# Patient Record
Sex: MALE
[De-identification: names, ages, dates, MRNs, and addresses within clinical notes are randomized per-mention and may not be internally consistent; named-entity substitution may affect disease eponyms.]

---

## 2017-03-21 ENCOUNTER — HOSPITAL ENCOUNTER (INPATIENT)
Dept: HOSPITAL 14 - H.ER | Age: 47
LOS: 2 days | Discharge: HOME | DRG: 313 | End: 2017-03-23
Attending: FAMILY MEDICINE | Admitting: FAMILY MEDICINE
Payer: COMMERCIAL

## 2017-03-21 DIAGNOSIS — Z23: ICD-10-CM

## 2017-03-21 DIAGNOSIS — R07.9: Primary | ICD-10-CM

## 2017-03-21 DIAGNOSIS — E16.2: ICD-10-CM

## 2017-03-21 DIAGNOSIS — J45.909: ICD-10-CM

## 2017-03-21 LAB
ALBUMIN/GLOB SERPL: 1.3 {RATIO} (ref 1–2.1)
ALP SERPL-CCNC: 62 U/L (ref 38–126)
ALT SERPL-CCNC: 25 U/L (ref 21–72)
APTT BLD: 22 SECONDS (ref 23.3–32.5)
AST SERPL-CCNC: 42 U/L (ref 17–59)
BASOPHILS # BLD AUTO: 0 K/UL (ref 0–0.2)
BASOPHILS NFR BLD: 0.6 % (ref 0–2)
BILIRUB SERPL-MCNC: 1.1 MG/DL (ref 0.2–1.3)
BUN SERPL-MCNC: 28 MG/DL (ref 9–20)
CALCIUM SERPL-MCNC: 9.2 MG/DL (ref 8.4–10.2)
CHLORIDE SERPL-SCNC: 101 MMOL/L (ref 98–107)
CHOLEST SERPL-MCNC: 150 MG/DL (ref 0–199)
CO2 SERPL-SCNC: 27 MMOL/L (ref 22–30)
EOSINOPHIL # BLD AUTO: 0.1 K/UL (ref 0–0.7)
EOSINOPHIL NFR BLD: 1.5 % (ref 0–4)
ERYTHROCYTE [DISTWIDTH] IN BLOOD BY AUTOMATED COUNT: 13.5 % (ref 11.5–14.5)
GLOBULIN SER-MCNC: 3.2 GM/DL (ref 2.2–3.9)
GLUCOSE SERPL-MCNC: 98 MG/DL (ref 75–110)
HCT VFR BLD CALC: 42.6 % (ref 35–51)
LIPASE SERPL-CCNC: 37 U/L (ref 23–300)
LYMPHOCYTES # BLD AUTO: 0.9 K/UL (ref 1–4.3)
LYMPHOCYTES NFR BLD AUTO: 22.4 % (ref 20–40)
MCH RBC QN AUTO: 31.8 PG (ref 27–31)
MCHC RBC AUTO-ENTMCNC: 34.3 G/DL (ref 33–37)
MCV RBC AUTO: 92.9 FL (ref 80–94)
MONOCYTES # BLD: 0.4 K/UL (ref 0–0.8)
MONOCYTES NFR BLD: 8.8 % (ref 0–10)
NEUTROPHILS # BLD: 2.7 K/UL (ref 1.8–7)
NEUTROPHILS NFR BLD AUTO: 66.7 % (ref 50–75)
NRBC BLD AUTO-RTO: 0.1 % (ref 0–0)
PLATELET # BLD: 94 K/UL (ref 130–400)
PMV BLD AUTO: 9 FL (ref 7.2–11.7)
POTASSIUM SERPL-SCNC: 5.1 MMOL/L (ref 3.6–5)
PROT SERPL-MCNC: 7.3 G/DL (ref 6.3–8.2)
SODIUM SERPL-SCNC: 140 MMOL/L (ref 132–148)
WBC # BLD AUTO: 4 K/UL (ref 4.8–10.8)

## 2017-03-21 PROCEDURE — 3E0234Z INTRODUCTION OF SERUM, TOXOID AND VACCINE INTO MUSCLE, PERCUTANEOUS APPROACH: ICD-10-PCS | Performed by: FAMILY MEDICINE

## 2017-03-21 NOTE — RAD
HISTORY:

dyspnea  



COMPARISON:

None available. 



TECHNIQUE:

Chest, one view.



FINDINGS:





LUNGS:

No focal consolidation.



Please note that chest x-ray has limited sensitivity for the 

detection of pulmonary masses.



PLEURA:

No significant pleural effusion identified. No definite pneumothorax .



CARDIOVASCULAR:

The cardiomediastinal silhouette appears within normal limits of 

size. 



OSSEOUS STRUCTURES:

 No acute osseous abnormality identified.



VISUALIZED UPPER ABDOMEN:

Unremarkable.



OTHER FINDINGS:

None.



IMPRESSION:

No focal consolidation, significant pleural effusion, or definite 

pneumothorax identified.

## 2017-03-21 NOTE — CP.PCM.HP
History of Present Illness





- History of Present Illness


History of Present Illness: 


Pt is a 47 year old male, with history of abdominal hernia and allergies and 

significant family history of cardiac problems with father  from a heart 

attack , presents to the ED complaining of lower chest pain for the past 2 

days. Pain is intermediate and worse with movement. Denies shortness of breath, 

headache, leg swelling, leg pain, injury, recent travel, nausea, vomiting, or 

diarrhea. states prior to this episode, a couple of weeks back he had similar 

symptoms with associated palpations called EMT but during the history process 

he told them his father passed away so he was told he was mostly likely 

depressed. after that episode he another episode where he was very shakey and 

felt his blood sugar was low along with chest pain. However today the only 

presenting symptom is chest with movement.








Present on Admission





- Present on Admission


Any Indicators Present on Admission: No





Review of Systems





- Review of Systems


All systems: reviewed and no additional remarkable complaints except


Review of Systems: 


per HPI





Past Patient History





- Past Social History


Alcohol: None


Drugs: Denies





- CARDIAC


Hx Hypertension: No





- PULMONARY


Hx Asthma: Yes





- NEUROLOGICAL


Hx Seizures: No





- HEMATOLOGICAL/ONCOLOGICAL


Hx Human Immunodeficiency Virus (HIV): No





- GASTROINTESTINAL


Other/Comment: Hx Ventral Hernia





- GENITOURINARY/GYNECOLOGICAL


Hx Sexually Transmitted Disorders: No





- PSYCHIATRIC


Hx Anxiety: Yes (Panic attacks)





- SURGICAL HISTORY


Hx Surgeries: No


Other/Comment: lt knee Sx





- ANESTHESIA


Hx Anesthesia: Yes


Hx Anesthesia Reactions: No





Meds


Allergies/Adverse Reactions: 


 Allergies











Allergy/AdvReac Type Severity Reaction Status Date / Time


 


No Known Allergies Allergy   Verified 17 11:00














Physical Exam





- Constitutional


Appears: Non-toxic, No Acute Distress





- Head Exam


Head Exam: NORMOCEPHALIC





- Eye Exam


Eye Exam: Normal appearance, PERRL


Pupil Exam: NORMAL ACCOMODATION





- ENT Exam


ENT Exam: Mucous Membranes Moist





- Respiratory Exam


Respiratory Exam: Clear to Auscultation Bilateral, NORMAL BREATHING PATTERN.  

absent: Rhonchi, Wheezes





- Cardiovascular Exam


Cardiovascular Exam: REGULAR RHYTHM, +S1, +S2





- GI/Abdominal Exam


GI & Abdominal Exam: Normal Bowel Sounds, Soft.  absent: Tenderness





- Extremities Exam


Extremities exam: Positive for: full ROM.  Negative for: calf tenderness, pedal 

edema





- Neurological Exam


Neurological exam: Alert, CN II-XII Intact, Oriented x3





- Skin


Skin Exam: Normal Color





Results





- Vital Signs


Recent Vital Signs: 





 Last Vital Signs











Temp  98.5 F   17 07:08


 


Pulse  76   17 07:45


 


Resp  18   17 07:08


 


BP  128/80   17 07:45


 


Pulse Ox  100   17 10:37














- Labs


Result Diagrams: 


 17 08:14





 17 08:14





Assessment & Plan





- Assessment and Plan (Free Text)


Assessment: 


48 y/o male with no siginificant medical history but strong family history of 

cardiac problems admitted for chest pain  rule out ACS


Plan: 


Chest pain r/o ACS


Admit to tele


f/u trops X2


echo and holter ordered


lipid panel ordered


aspirin 81mg daily





Episode of hypoglycemia


Hemoglobin A1C ordered





diet- heart healthy





DVT prophylaxis- Lovenox SC 40mg 





Decision To Admit





- Pt Status Changed To:


Hospital Disposition Of: Observation





- .


Bed Request Type: Telemetry


Admitting Physician: Miller Gomez

## 2017-03-21 NOTE — CARD
--------------- APPROVED REPORT --------------





EXAM: Two-dimensional and M-mode echocardiogram with Doppler and 

color Doppler.



Other Information 

Quality : GoodRhythm : NSR



INDICATION

Chest Pain 



2D DIMENSIONS 

IVSd1.05   (0.7-1.1cm)LVDd5.53   (3.9-5.9cm)

LVOT Diameter2.15   (1.8-2.4cm)PWd1.03   (0.7-1.1cm)

IVSs1.65   (0.8-1.2cm)LVDs3.30   (2.5-4.0cm)

FS (%) 40.3   %PWs1.75   (0.8-1.2cm)



M-Mode DIMENSIONS 

Left Atrium (MM)3.73   (2.5-4.0cm)IVSd0.80   (0.7-1.1cm)

Aortic Root3.35   (2.2-3.7cm)LVDd6.02   (4.0-5.6cm)

Aortic Cusp Exc.2.24   (1.5-2.0cm)PWd0.90   (0.7-1.1cm)

IVSs1.39   cmFS (%) 35   %

LVDs3.89   (2.0-3.8cm)PWs1.31   cm



Mitral Valve

MV E Ivdxxdrm23.8cm/sMV DECEL [id number] A Irfwcyhd00.4cm/s

MV KNU41olG/A ratio1.3MVA (PHT)3.18cm2



TDI

Lateral E' Peak V17.11cm/sMedial E' Peak V9.13cm/sE/Lateral E'3.3

E/Medial E'6.1



Pulmonary Valve

PV Peak Syfkucpm51.3cm/s



Tricuspid Valve

TR Peak Kejkyjvh661lh/sRAP EXNJXFVI66iaZxMT Peak Gr.17mmHg

EUYF32mtWb



 LEFT VENTRICLE 

The left ventricle is normal size.

There is normal left ventricular wall thickness.

Left ventricle systolic function is normal.

The Ejection Fraction is 60-65%.

There is normal LV segmental wall motion.

The left ventricular diastolic function is normal.



 RIGHT VENTRICLE 

The right ventricle is normal size.

There is normal right ventricular wall thickness.

The right ventricular systolic function is normal.



 ATRIA 

The left atrium size is normal.

The right atrium size is normal.



 AORTIC VALVE 

The aortic valve is normal in structure and function.

No aortic regurgitation is present.

There is no aortic valvular stenosis. 



 MITRAL VALVE 

The mitral valve is normal in structure and function.

There is no evidence of mitral valve prolapse.

There is no mitral valve stenosis.

Mitral regurgitation is trace to mild.



 TRICUSPID VALVE 

The tricuspid valve is normal in structure and function.

There is trace to mild tricuspid regurgitation.

Right ventricular systolic pressure is estimated at 28 mmHg. 

There is no pulmonary hypertension.



 PULMONIC VALVE 

The pulmonary valve is normal in structure and function.

There is no pulmonic valvular regurgitation. 



 GREAT VESSELS 

The aortic root is normal in size.

The IVC is normal in size and collapses >50% with inspiration.



 PERICARDIAL EFFUSION 

The pericardium appears normal.



<Conclusion>

The left ventricle is normal size.

There is normal left ventricular wall thickness.

There is normal LV segmental wall motion.

Left ventricle systolic function is normal.

The Ejection Fraction is 60-65%.

The left ventricular diastolic function is normal.

## 2017-03-22 RX ADMIN — ENOXAPARIN SODIUM SCH MG: 40 INJECTION SUBCUTANEOUS at 09:19

## 2017-03-22 NOTE — CP.PCM.CON
Past Patient History





- Past Medical History & Family History


Past Medical History?: Yes





- Past Social History


Smoking Status: Never Smoked





- CARDIAC


Hx Cardiac Disorders: No





- PULMONARY


Hx Asthma: Yes





- NEUROLOGICAL


Hx Seizures: No





- HEMATOLOGICAL/ONCOLOGICAL


Hx Human Immunodeficiency Virus (HIV): No





- MUSCULOSKELETAL/RHEUMATOLOGICAL


Hx Falls: No





- GASTROINTESTINAL


Other/Comment: Hx Ventral Hernia





- GENITOURINARY/GYNECOLOGICAL


Hx Sexually Transmitted Disorders: No





- PSYCHIATRIC


Hx Substance Use: No





- SURGICAL HISTORY


Hx Surgeries: No


Other/Comment: lt knee Sx





- ANESTHESIA


Hx Anesthesia: Yes


Hx Anesthesia Reactions: No





Meds


Allergies/Adverse Reactions: 


 Allergies











Allergy/AdvReac Type Severity Reaction Status Date / Time


 


No Known Allergies Allergy   Verified 03/21/17 11:00














- Medications


Medications: 


 Current Medications





Aspirin (Aspirin Chewable)  81 mg PO DAILY [facility]


   Last Admin: 03/22/17 09:20 Dose:  81 mg


Enoxaparin Sodium (Lovenox)  40 mg SC DAILY [facility]


   PRN Reason: Protocol


   Last Admin: 03/22/17 09:19 Dose:  40 mg











Results





- Vital Signs


Recent Vital Signs: 


 Last Vital Signs











Temp  98 F   03/22/17 19:25


 


Pulse  66   03/22/17 19:25


 


Resp  20   03/22/17 19:25


 


BP  107/65   03/22/17 19:25


 


Pulse Ox  97   03/22/17 19:25














- Labs


Result Diagrams: 


 03/21/17 08:14





 03/21/17 08:14


Labs: 


 Laboratory Results - last 24 hr











  03/22/17





  09:36


 


Troponin I  < 0.0120

## 2017-03-22 NOTE — CARD
--------------- APPROVED REPORT --------------





EKG Measurement

Heart [id number]

MO [id number]

[id number]

[id number]

[id number]



<Conclusion>

Normal sinus rhythm

Possible Left atrial enlargement

Borderline ECG

## 2017-03-22 NOTE — CP.PCM.PCO
Additional Comments





- Additional Comments


Additional Comments: 


Paged by RN due to elevated troponin.





Patient was admitted for chest pain and to r/o ACS. Patient was seen and 

examined at bedside. Patient states he feels well, no chest pain, dyspnea, 

palpitations, abdominal pain, headache, or diaphoresis. 





STAT EKG obtained, no acute changes from EKG on admission.


Due to asymptomatic, no EKG changes or abnormalities of telemetry, will order 

repeat troponin for 9AM to trend.


PCP to follow up on further management of care.


All above discussed with patient and nursing staff.

## 2017-03-22 NOTE — CARD
--------------- APPROVED REPORT --------------





EKG Measurement

Heart [id number]

CO [id number]

[id number]

[id number]

[id number]



<Conclusion>

Unusual P axis, possible ectopic atrial rhythm

Left axis deviation

Nonspecific T wave abnormality

Abnormal ECG

## 2017-03-22 NOTE — CP.PCM.PN
Subjective





- Date & Time of Evaluation


Date of Evaluation: 03/22/17


Time of Evaluation: 10:55





- Subjective


Subjective: 


pt seen and examined at bedside, denies any chest pain, sob, abdominal pain. 

feels ok. has not had chest pain since admission. overnight events noted 

reviewed





Objective





- Vital Signs/Intake and Output


Vital Signs (last 24 hours): 


 











Temp Pulse Resp BP Pulse Ox


 


 97.1 F L  65   18   121/76   96 


 


 03/22/17 12:00  03/22/17 12:00  03/22/17 12:00  03/22/17 12:00  03/22/17 12:00











- Medications


Medications: 


 Current Medications





Aspirin (Aspirin Chewable)  81 mg PO DAILY [facility]


   Last Admin: 03/22/17 09:20 Dose:  81 mg


Enoxaparin Sodium (Lovenox)  40 mg SC DAILY [facility]


   PRN Reason: Protocol


   Last Admin: 03/22/17 09:19 Dose:  40 mg











- Labs


Labs: 


 











PT  12.9 SECONDS (9.6-11.2)  H  03/21/17  08:14    


 


INR  1.24  (0.92-1.08)  H  03/21/17  08:14    


 


APTT  22.0 SECONDS (23.3-32.5)  L  03/21/17  08:14    














- Constitutional


Appears: Non-toxic, No Acute Distress





- Head Exam


Head Exam: NORMOCEPHALIC





- Eye Exam


Eye Exam: Normal appearance, PERRL


Pupil Exam: NORMAL ACCOMODATION





- ENT Exam


ENT Exam: Mucous Membranes Moist





- Respiratory Exam


Respiratory Exam: Clear to Ausculation Bilateral, NORMAL BREATHING PATTERN.  

absent: Rhonchi, Wheezes





- Cardiovascular Exam


Cardiovascular Exam: REGULAR RHYTHM, +S1, +S2





- GI/Abdominal Exam


GI & Abdominal Exam: Soft, Normal Bowel Sounds.  absent: Tenderness





- Extremities Exam


Extremities Exam: absent: Calf Tenderness, Pedal Edema





- Neurological Exam


Neurological Exam: Alert, Awake, CN II-XII Intact, Oriented x3





Assessment and Plan





- Assessment and Plan (Free Text)


Assessment: 


48 y/o male with no siginificant medical history but strong family history of 

cardiac problems admitted for chest pain  rule out ACS


 





Plan: 


Chest pain r/o ACS


last trop level was .14, repeat trop at 9am was <.012


pt remains asymptomatic 


cardiology consulted


echo shows no abnormality


lipid panel - ok


aspirin 81mg daily





Episode of hypoglycemia


Hemoglobin A1C 5.1 , no diabetes 





diet- heart healthy





DVT prophylaxis- Lovenox SC 40mg

## 2017-03-22 NOTE — CP.PCM.CON
History of Present Illness





- History of Present Illness


History of Present Illness: 


patient seen/ examined.  full consult to follow.  troponin noted.  The second 

troponin is likely an error.  However given symptoms recommend stress test.





Past Patient History





- Past Medical History & Family History


Past Medical History?: Yes





- Past Social History


Smoking Status: Never Smoked





- CARDIAC


Hx Cardiac Disorders: No





- PULMONARY


Hx Asthma: Yes





- NEUROLOGICAL


Hx Seizures: No





- HEMATOLOGICAL/ONCOLOGICAL


Hx Human Immunodeficiency Virus (HIV): No





- MUSCULOSKELETAL/RHEUMATOLOGICAL


Hx Falls: No





- GASTROINTESTINAL


Other/Comment: Hx Ventral Hernia





- GENITOURINARY/GYNECOLOGICAL


Hx Sexually Transmitted Disorders: No





- PSYCHIATRIC


Hx Substance Use: No





- SURGICAL HISTORY


Hx Surgeries: No


Other/Comment: lt knee Sx





- ANESTHESIA


Hx Anesthesia: Yes


Hx Anesthesia Reactions: No





Meds


Allergies/Adverse Reactions: 


 Allergies











Allergy/AdvReac Type Severity Reaction Status Date / Time


 


No Known Allergies Allergy   Verified 03/21/17 11:00














- Medications


Medications: 


 Current Medications





Aspirin (Aspirin Chewable)  81 mg PO DAILY [facility]


   Last Admin: 03/22/17 09:20 Dose:  81 mg


Enoxaparin Sodium (Lovenox)  40 mg SC DAILY [facility]


   PRN Reason: Protocol


   Last Admin: 03/22/17 09:19 Dose:  40 mg











Results





- Vital Signs


Recent Vital Signs: 


 Last Vital Signs











Temp  98 F   03/22/17 19:25


 


Pulse  66   03/22/17 19:25


 


Resp  20   03/22/17 19:25


 


BP  107/65   03/22/17 19:25


 


Pulse Ox  97   03/22/17 19:25














- Labs


Result Diagrams: 


 03/21/17 08:14





 03/21/17 08:14


Labs: 


 Laboratory Results - last 24 hr











  03/22/17





  09:36


 


Troponin I  < 0.0120

## 2017-03-23 VITALS
OXYGEN SATURATION: 98 % | HEART RATE: 76 BPM | SYSTOLIC BLOOD PRESSURE: 127 MMHG | TEMPERATURE: 98.4 F | DIASTOLIC BLOOD PRESSURE: 85 MMHG

## 2017-03-23 VITALS — RESPIRATION RATE: 18 BRPM

## 2017-03-23 RX ADMIN — ENOXAPARIN SODIUM SCH MG: 40 INJECTION SUBCUTANEOUS at 08:24

## 2017-03-23 NOTE — CP.PCM.DIS
Provider





- Provider


Date of Admission: 


03/22/17 09:14





Attending physician: 


Miller Gomez MD





Primary care physician: 


Jason 


Consults: 


cardiology


Time Spent in preparation of Discharge (in minutes): 20





Diagnosis





- Discharge Diagnosis


(1) Chest pain


Status: Acute








Hospital Course





- Lab Results


Lab Results: 


 Most Recent Lab Values











WBC  4.0 K/uL (4.8-10.8)  L  03/21/17  08:14    


 


RBC  4.59 Mil/uL (4.40-5.90)   03/21/17  08:14    


 


Hgb  14.6 g/dL (12.0-18.0)   03/21/17  08:14    


 


Hct  42.6 % (35.0-51.0)   03/21/17  08:14    


 


MCV  92.9 fl (80.0-94.0)   03/21/17  08:14    


 


MCH  31.8 pg (27.0-31.0)  H  03/21/17  08:14    


 


MCHC  34.3 g/dL (33.0-37.0)   03/21/17  08:14    


 


RDW  13.5 % (11.5-14.5)   03/21/17  08:14    


 


Plt Count  94 K/uL (130-400)  L  03/21/17  08:14    


 


MPV  9.0 fl (7.2-11.7)   03/21/17  08:14    


 


Neut % (Auto)  66.7 % (50.0-75.0)   03/21/17  08:14    


 


Lymph % (Auto)  22.4 % (20.0-40.0)   03/21/17  08:14    


 


Mono % (Auto)  8.8 % (0.0-10.0)   03/21/17  08:14    


 


Eos % (Auto)  1.5 % (0.0-4.0)   03/21/17  08:14    


 


Baso % (Auto)  0.6 % (0.0-2.0)   03/21/17  08:14    


 


Neut #  2.7 K/uL (1.8-7.0)   03/21/17  08:14    


 


Lymph #  0.9 K/uL (1.0-4.3)  L  03/21/17  08:14    


 


Mono #  0.4 K/uL (0.0-0.8)   03/21/17  08:14    


 


Eos #  0.1 K/uL (0.0-0.7)   03/21/17  08:14    


 


Baso #  0.0 K/uL (0.0-0.2)   03/21/17  08:14    


 


PT  12.9 SECONDS (9.6-11.2)  H  03/21/17  08:14    


 


INR  1.24  (0.92-1.08)  H  03/21/17  08:14    


 


APTT  22.0 SECONDS (23.3-32.5)  L  03/21/17  08:14    


 


Sodium  140 mmol/l (132-148)   03/21/17  08:14    


 


Potassium  5.1 MMOL/L (3.6-5.0)  H  03/21/17  08:14    


 


Chloride  101 mmol/L ()   03/21/17  08:14    


 


Carbon Dioxide  27 mmol/L (22-30)   03/21/17  08:14    


 


Anion Gap  17  (10-20)   03/21/17  08:14    


 


BUN  28 mg/dl (9-20)  H  03/21/17  08:14    


 


Creatinine  0.8 mg/dL (0.8-1.5)   03/21/17  08:14    


 


Est GFR ( Amer)  > 60   03/21/17  08:14    


 


Est GFR (Non-Af Amer)  > 60   03/21/17  08:14    


 


POC Glucose (mg/dL)  71 mg/dL ()   03/22/17  07:05    


 


Random Glucose  98 mg/dL ()   03/21/17  08:14    


 


Hemoglobin A1c  5.1 % (4.2-6.5)   03/21/17  11:46    


 


Calcium  9.2 mg/dL (8.4-10.2)   03/21/17  08:14    


 


Total Bilirubin  1.1 mg/dl (0.2-1.3)   03/21/17  08:14    


 


AST  42 U/L (17-59)   03/21/17  08:14    


 


ALT  25 U/L (21-72)   03/21/17  08:14    


 


Alkaline Phosphatase  62 U/L ()   03/21/17  08:14    


 


Troponin I  < 0.0120 ng/mL (0.00-0.120)   03/22/17  09:36    


 


Total Protein  7.3 G/DL (6.3-8.2)   03/21/17  08:14    


 


Albumin  4.1 g/dL (3.5-5.0)   03/21/17  08:14    


 


Globulin  3.2 gm/dL (2.2-3.9)   03/21/17  08:14    


 


Albumin/Globulin Ratio  1.3  (1.0-2.1)   03/21/17  08:14    


 


Triglycerides  51 mg/DL (0-149)   03/21/17  12:30    


 


Cholesterol  150 mg/dL (0-199)   03/21/17  12:30    


 


LDL Cholesterol Direct  87 mg/dL (0-129)   03/21/17  12:30    


 


HDL Cholesterol  43 MG/DL (30-70)   03/21/17  12:30    


 


Lipase  37 U/L ()   03/21/17  08:14    














- Hospital Course


Hospital Course: 


pt admitted for chest pain, 3rd trop was mildly elevated, 4th was WNL, 

cardiology was consulted, echo normal, stress test normal.  Pt is being 

discharged home on preventative statin, metroprolol and aspirin and follow up 

with Dr. Gomez. Remained asymptomatic through out hospital stay





Discharge Exam





- Head Exam


Head Exam: NORMOCEPHALIC





- Eye Exam


Eye Exam: Normal appearance





- Respiratory Exam


Respiratory Exam: NORMAL BREATHING PATTERN





- Cardiovascular Exam


Cardiovascular Exam: REGULAR RHYTHM, +S1, +S2





- GI/Abdominal Exam


GI & Abdominal Exam: Normal Bowel Sounds, Soft





- Extremities Exam


Extremities exam: normal inspection





- Neurological Exam


Neurological exam: Alert, CN II-XII Intact, Oriented x3





Discharge Plan





- Discharge Medications


Prescriptions: 


Aspirin [Aspirin Chewable] 81 mg PO DAILY #30 chew


Atorvastatin [Lipitor] 10 mg PO DIN #30 tab


Metoprolol Succinate 25 mg PO DAILY #2 tab.er.24h





- Follow Up Plan


Condition: STABLE


Disposition: HOME/ ROUTINE


Instructions:  Chest Pain (DC)

## 2017-03-24 NOTE — CARD
--------------- APPROVED REPORT --------------





Protocol: JULIUS

Test Type: Treadmill Stress Test

Attending Physician: Dr. NIETO

Referring Physician: Dr. MCKEON

Technologist: Haley Pardo

Test Indications: R07.9

Medications: ASPIRIN  81MG,  LOVENOX 40MG

Medical History: FAMILY HISTORY,  ASTHMA,  VENTRAL HERNIA,  RT KNEE 

SURGERY

Target HR: 172 bpm

Resting ECG: normal

Resting Heart Rate: 86 bpm

Resting Blood Pressure: 146/86mmHg

submaximum (85%): 146 bpm



TEST SUMMARY

[id number]/79.0.

[id number]/79.0.

PRETESTHYPERV.00:[id number]/79.0.

PRETESTWARM-[id number]/86.0.

EXERCISESTAGE 103:[id number]/80.0.

EXERCISESTAGE 203:[id number]/80.0.

EXERCISESTAGE 301:[id number]/80.0.

[id number]/80.0.



POST EXERCISE

Reason for Termination: Fatigue

Target HR: NoMax HR: 153 bpm88% of Maximum Predicted HR: 172 

bpm

Exercise duration: 3 Stage07:10 min:secExercise capacity: 

8.7METs

Max Blood Pressure: 180/80mmHg

Blood Pressure response to exercise: normal resting BP - appropriate 

response

Heart Rate response to exercise: appropriate

Chest Pain: NononeAngina index: 0

Arrhythmia: Nonone

ST Change: NononeDeviation: 0 mm



INTERPRETATION

Stress EKG Conclusion: This 47-year-old man underwent this study to 

rule out evidence of coronary artery disease. He was hospitalized 

with atypical chest pain syndrome and an acute myocardial infarction 

was ruled out. He was not hypertensive or diabetic or a smoker and 

had never reported effort related chest pain.

His resting electric cardiogram showed sinus rhythm at 60 bpm with a 

normal EKG pattern. His resting blood pressure was 146/86 mmHg. His 

cardiac auscultation was unremarkable.

On Julius protocol he was able to finish 1 minute and 10 seconds of 

stage III effort when severe fatigue and shortness of breath forced 

an end to exercise. There was neither chest pain nor ischemic 

electrocardiographic  abnormality. At peak effort there were 

upsloping ST depressions in lead 23 aVF and V5 and V6. The peak 

workload achieved was 8.7 mets. The peak heart rate achieved was 154 

bpm which corresponded to 86% of his predicted maximum heart rate. 

His blood pressure response to exercise was appropriate reaching 

180/80 mmHg at peak effort. There were no arrhythmias. Upon 

termination of exercise, his heart rate and blood pressure recovery 

were normal. The patient left the stress lab symptom free and 

hemodynamically stable.



CONCLUSION: The patient had an average effort tolerance. His blood 

pressure response to exercise was appropriate. At 86% of his 

predicted maximum heart rate, no evidence of myocardial ischemia was 

detected.

## 2017-09-30 ENCOUNTER — HOSPITAL ENCOUNTER (EMERGENCY)
Dept: HOSPITAL 14 - H.ER | Age: 47
Discharge: HOME | End: 2017-09-30
Payer: COMMERCIAL

## 2017-09-30 VITALS
SYSTOLIC BLOOD PRESSURE: 135 MMHG | TEMPERATURE: 98 F | HEART RATE: 81 BPM | RESPIRATION RATE: 15 BRPM | DIASTOLIC BLOOD PRESSURE: 76 MMHG

## 2017-09-30 VITALS — OXYGEN SATURATION: 99 %

## 2017-09-30 VITALS — BODY MASS INDEX: 29.1 KG/M2

## 2017-09-30 DIAGNOSIS — D72.819: ICD-10-CM

## 2017-09-30 DIAGNOSIS — R10.31: Primary | ICD-10-CM

## 2017-09-30 LAB
ALBUMIN/GLOB SERPL: 1.4 {RATIO} (ref 1–2.1)
ALP SERPL-CCNC: 53 U/L (ref 38–126)
ALT SERPL-CCNC: 30 U/L (ref 21–72)
AST SERPL-CCNC: 19 U/L (ref 17–59)
BACTERIA #/AREA URNS HPF: (no result) /[HPF]
BASOPHILS # BLD AUTO: 0 K/UL (ref 0–0.2)
BASOPHILS NFR BLD: 0.4 % (ref 0–2)
BILIRUB SERPL-MCNC: 0.8 MG/DL (ref 0.2–1.3)
BILIRUB UR-MCNC: NEGATIVE MG/DL
BUN SERPL-MCNC: 22 MG/DL (ref 9–20)
CALCIUM SERPL-MCNC: 9.1 MG/DL (ref 8.4–10.2)
CHLORIDE SERPL-SCNC: 104 MMOL/L (ref 98–107)
CO2 SERPL-SCNC: 25 MMOL/L (ref 22–30)
COLOR UR: YELLOW
EOSINOPHIL # BLD AUTO: 0.1 K/UL (ref 0–0.7)
EOSINOPHIL NFR BLD: 1.8 % (ref 0–4)
ERYTHROCYTE [DISTWIDTH] IN BLOOD BY AUTOMATED COUNT: 13.4 % (ref 11.5–14.5)
GLOBULIN SER-MCNC: 2.8 GM/DL (ref 2.2–3.9)
GLUCOSE SERPL-MCNC: 104 MG/DL (ref 75–110)
GLUCOSE UR STRIP-MCNC: (no result) MG/DL
HCT VFR BLD CALC: 41.1 % (ref 35–51)
KETONES UR STRIP-MCNC: NEGATIVE MG/DL
LEUKOCYTE ESTERASE UR-ACNC: (no result) LEU/UL
LYMPHOCYTES # BLD AUTO: 0.5 K/UL (ref 1–4.3)
LYMPHOCYTES NFR BLD AUTO: 17.5 % (ref 20–40)
MCH RBC QN AUTO: 32 PG (ref 27–31)
MCHC RBC AUTO-ENTMCNC: 34.9 G/DL (ref 33–37)
MCV RBC AUTO: 91.8 FL (ref 80–94)
MONOCYTES # BLD: 0.3 K/UL (ref 0–0.8)
MONOCYTES NFR BLD: 9.6 % (ref 0–10)
NEUTROPHILS # BLD: 2 K/UL (ref 1.8–7)
NEUTROPHILS NFR BLD AUTO: 70.7 % (ref 50–75)
NRBC BLD AUTO-RTO: 0.3 % (ref 0–0)
PH UR STRIP: 7 [PH] (ref 5–8)
PLATELET # BLD: 74 K/UL (ref 130–400)
PMV BLD AUTO: 9.7 FL (ref 7.2–11.7)
POTASSIUM SERPL-SCNC: 4 MMOL/L (ref 3.6–5)
PROT SERPL-MCNC: 6.6 G/DL (ref 6.3–8.2)
PROT UR STRIP-MCNC: NEGATIVE MG/DL
RBC # UR STRIP: NEGATIVE /UL
RBC #/AREA URNS HPF: 3 /HPF (ref 0–3)
SODIUM SERPL-SCNC: 142 MMOL/L (ref 132–148)
SP GR UR STRIP: 1.01 (ref 1–1.03)
UROBILINOGEN UR-MCNC: (no result) MG/DL (ref 0.2–1)
WBC # BLD AUTO: 2.8 K/UL (ref 4.8–10.8)
WBC #/AREA URNS HPF: < 1 /HPF (ref 0–5)

## 2017-09-30 PROCEDURE — 85025 COMPLETE CBC W/AUTO DIFF WBC: CPT

## 2017-09-30 PROCEDURE — 80053 COMPREHEN METABOLIC PANEL: CPT

## 2017-09-30 PROCEDURE — 99285 EMERGENCY DEPT VISIT HI MDM: CPT

## 2017-09-30 PROCEDURE — 74177 CT ABD & PELVIS W/CONTRAST: CPT

## 2017-09-30 PROCEDURE — 81003 URINALYSIS AUTO W/O SCOPE: CPT

## 2017-09-30 PROCEDURE — 96374 THER/PROPH/DIAG INJ IV PUSH: CPT

## 2017-09-30 NOTE — ED PDOC
HPI: Abdomen


Time Seen by Provider: 09/30/17 09:25


Chief Complaint (Nursing): Groin Pain


Chief Complaint (Provider): right groin pain


History Per: Patient


History/Exam Limitations: no limitations


Onset/Duration Of Symptoms: Days (3), Gradual


Current Symptoms Are (Timing): Still Present


Location Of Pain/Discomfort: RLQ


Quality Of Discomfort: Sharp


Associated Symptoms: denies: Nausea, Vomiting, Diarrhea, Loss Of Appetite, Back 

Pain


Exacerbating Factors: Other (bowel movement)


Alleviating Factors: None


Additional History Per: Prior Records (ED visit 8/16)


Additional Complaint(s): 





48yo male c/o right groin pain ongoing x3 days, states pain sharp, worsened w/ 

bowel movement, denies radiation of pain to testicle or back. States had 

similar pain last august, seen in ED and CT only showed left varicocele per 

report. States went to a surgeon and vascular specialist told nothing to do. 

Denies fever, vomiting, weakness or rash. 





Past Medical History


Reviewed: Historical Data, Nursing Documentation, Vital Signs


Vital Signs: 


 Last Vital Signs











Temp  98.6 F   09/30/17 09:16


 


Pulse  77   09/30/17 09:16


 


Resp  18   09/30/17 09:16


 


BP  138/74   09/30/17 09:16


 


Pulse Ox  99   09/30/17 10:38














- Medical History


PMH: Anxiety (Panic attacks), Asthma


   Denies: Diabetes, Hepatitis, HIV, HTN, Seizures, Sexually Transmitted Disease





- Family History


Family History: States: Unknown Family Hx, CAD





- Home Medications


Home Medications: 


 Ambulatory Orders











 Medication  Instructions  Recorded


 


Aspirin [Aspirin Chewable] 81 mg PO DAILY #30 chew 03/23/17


 


Atorvastatin [Lipitor] 10 mg PO DIN #30 tab 03/23/17


 


Metoprolol Succinate 25 mg PO DAILY #2 tab.er.24h 03/23/17


 


Naproxen [Naprosyn] 500 mg PO BID PRN #14 tablet 09/30/17














- Allergies


Allergies/Adverse Reactions: 


 Allergies











Allergy/AdvReac Type Severity Reaction Status Date / Time


 


No Known Allergies Allergy   Verified 03/21/17 11:00














Review of Systems


ROS Statement: Except As Marked, All Systems Reviewed And Found Negative


Constitutional: Negative for: Fever, Chills


Cardiovascular: Negative for: Chest Pain, Palpitations


Respiratory: Negative for: Cough, Shortness of Breath


Gastrointestinal: Positive for: Abdominal Pain.  Negative for: Nausea, Vomiting


Genitourinary Male: Negative for: Dysuria, Frequency


Musculoskeletal: Negative for: Neck Pain, Shoulder Pain


Skin: Negative for: Rash, Lesions, Jaundice


Neurological: Negative for: Weakness, Numbness, Headache





Physical Exam





- Reviewed


Nursing Documentation Reviewed: Yes


Vital Signs Reviewed: Yes





- Physical Exam


Appears: Positive for: Well, Non-toxic, No Acute Distress


Head Exam: Positive for: ATRAUMATIC, NORMAL INSPECTION, NORMOCEPHALIC


Skin: Positive for: Normal Color, Warm, DRY


Eye Exam: Positive for: EOMI, Normal appearance, PERRL


ENT: Positive for: Normal ENT Inspection


Neck: Positive for: Normal, Painless ROM


Cardiovascular/Chest: Positive for: Regular Rate, Rhythm


Respiratory: Positive for: CNT, Normal Breath Sounds


Gastrointestinal/Abdominal: Positive for: Bowel Sounds, Soft.  Negative for: 

Tenderness, Guarding


Back: Positive for: Normal Inspection


Extremity: Positive for: Normal ROM


Neurologic/Psych: Positive for: Alert, Oriented





- Laboratory Results


Result Diagrams: 


 09/30/17 10:20





 09/30/17 10:20





- ECG


O2 Sat by Pulse Oximetry: 99





Medical Decision Making


Medical Decision Making: 





Will obtain bloodwork and CT imaging r.o atypical appendicitis, R hernia or 

other abdominal pathology





labs reviewed, moderate leukopenia needs repeat one week.


chem and UDip unremarkable








CT imaging:


Accession No. : [id number]


Patient Name / ID : QUINTIN GEORGE  / [id number]


Exam Date : 09/30/2017 12:49:56 ( Approved )


Study Comment : 


Sex / Age : M  / 47Y





Creator : Leo Severino MD


Dictator : 


 : 


Approver : Leo Severino MD


Approver2 : 





Report Date : 09/30/2017 13:32:22


My Comment : 


********************************************************************************

***





PROCEDURE:  CT Abdomen and Pelvis with contrast





HISTORY:


R groin pain r/o hernia





COMPARISON:


None.





TECHNIQUE:


Contrast dose: Omnipaque 300, 95 cc





Radiation dose:





Total exam DLP = 1004.8 mGy-cm.





This CT exam was performed using one or more of the following dose reduction 

techniques: Automated exposure control, adjustment of the mA and/or kV 

according to patient size, and/or use of iterative reconstruction technique.





FINDINGS:





LOWER THORAX:


Unremarkable. 





LIVER:


Unremarkable. No gross lesion or ductal dilatation. 





GALLBLADDER AND BILE DUCTS:


Unremarkable. 





PANCREAS:


Unremarkable. No gross lesion or ductal dilatation.





SPLEEN:


There is a tiny lucency seen posteriorly which is too small to characterize. 





ADRENALS:


Unremarkable. No mass. 





KIDNEYS AND URETERS:


Unremarkable. No hydronephrosis. No solid mass. 





VASCULATURE:


Unremarkable. No aortic aneurysm. 





BOWEL:


Hyper peristalsis generates artifact limiting the exam somewhat. No bowel 

obstruction measure edema ascites or free intrarenal gas identified. No gross 

mural thickening is appreciate throughout small large-bowel loops. 





APPENDIX:


No CT evidence to suggest appendicitis. . 





PERITONEUM:


Unremarkable. No free fluid. No free air. 





LYMPH NODES:


Unremarkable. No enlarged lymph nodes. 





BLADDER:


Distended but otherwise smooth walled unremarkable. . 





REPRODUCTIVE:


Unremarkable. 





BONES:


No acute fracture. 





OTHER FINDINGS:


No ventral abdominal wall or inguinal hernia is appreciated bilaterally.





IMPRESSION:


1. No definite acute abdominal or pelvic findings.





2. Hyperperistalsis generates artifacts limiting evaluation.





3. Tiny lucency noted at spleen, too small to evaluate.














---------------





Results discussed including leukopenia, will followup w PMD.


Rx naprosyn for pain. 











Disposition





- Clinical Impression


Clinical Impression: 


 Leukopenia, Groin pain








- Patient ED Disposition


Is Patient to be Admitted: No


Counseled Patient/Family Regarding: Studies Performed, Diagnosis, Need For 

Followup, Rx Given





- Disposition


Disposition: Routine/Home


Disposition Time: 13:40


Condition: STABLE


Additional Instructions: 


See your doctor for further testing.





Have your white blood cell count repeated in one week to assure normalization.





See your primary doctor for further testing as to precise cause of your groin 

pain.





Recommend naprosyn 500mg every 12hrs as needed for pain.


Drink plenty of fluids





Return to ER for any worse or new symptoms.





Prescriptions: 


Naproxen [Naprosyn] 500 mg PO BID PRN #14 tablet


 PRN Reason: Pain, Moderate (4-7)


Instructions:  Groin Pain (ED)


Forms:  [facility] (English)

## 2017-09-30 NOTE — CT
PROCEDURE:  CT Abdomen and Pelvis with contrast



HISTORY:

R groin pain r/o hernia



COMPARISON:

None.



TECHNIQUE:

Contrast dose: Omnipaque 300, 95 cc



Radiation dose:



Total exam DLP = 1004.8 mGy-cm.



This CT exam was performed using one or more of the following dose 

reduction techniques: Automated exposure control, adjustment of the 

mA and/or kV according to patient size, and/or use of iterative 

reconstruction technique.



FINDINGS:



LOWER THORAX:

Unremarkable. 



LIVER:

Unremarkable. No gross lesion or ductal dilatation. 



GALLBLADDER AND BILE DUCTS:

Unremarkable. 



PANCREAS:

Unremarkable. No gross lesion or ductal dilatation.



SPLEEN:

There is a tiny lucency seen posteriorly which is too small to 

characterize. 



ADRENALS:

Unremarkable. No mass. 



KIDNEYS AND URETERS:

Unremarkable. No hydronephrosis. No solid mass. 



VASCULATURE:

Unremarkable. No aortic aneurysm. 



BOWEL:

Hyper peristalsis generates artifact limiting the exam somewhat. No 

bowel obstruction measure edema ascites or free intrarenal gas 

identified. No gross mural thickening is appreciate throughout small 

large-bowel loops. 



APPENDIX:

No CT evidence to suggest appendicitis. . 



PERITONEUM:

Unremarkable. No free fluid. No free air. 



LYMPH NODES:

Unremarkable. No enlarged lymph nodes. 



BLADDER:

Distended but otherwise smooth walled unremarkable. . 



REPRODUCTIVE:

Unremarkable. 



BONES:

No acute fracture. 



OTHER FINDINGS:

No ventral abdominal wall or inguinal hernia is appreciated 

bilaterally.



IMPRESSION:

1. No definite acute abdominal or pelvic findings.



2. Hyperperistalsis generates artifacts limiting evaluation.



3. Tiny lucency noted at spleen, too small to evaluate.

## 2017-11-29 ENCOUNTER — HOSPITAL ENCOUNTER (EMERGENCY)
Dept: HOSPITAL 14 - H.ER | Age: 47
Discharge: HOME | End: 2017-11-29
Payer: COMMERCIAL

## 2017-11-29 VITALS
TEMPERATURE: 98.1 F | HEART RATE: 80 BPM | DIASTOLIC BLOOD PRESSURE: 79 MMHG | OXYGEN SATURATION: 95 % | SYSTOLIC BLOOD PRESSURE: 127 MMHG

## 2017-11-29 VITALS — BODY MASS INDEX: 29.5 KG/M2

## 2017-11-29 VITALS — RESPIRATION RATE: 16 BRPM

## 2017-11-29 DIAGNOSIS — Z87.891: ICD-10-CM

## 2017-11-29 DIAGNOSIS — F41.9: ICD-10-CM

## 2017-11-29 DIAGNOSIS — Z79.82: ICD-10-CM

## 2017-11-29 DIAGNOSIS — J18.9: Primary | ICD-10-CM

## 2017-11-29 NOTE — RAD
HISTORY:

Cough.



COMPARISON:

03/21/2017



TECHNIQUE:

Chest PA and lateral



FINDINGS:



LUNGS:

Left lower lobe infiltrate suspicious for pneumonia. This appears in 

the basilar segment.



PLEURA:

No significant pleural effusion identified. No pneumothorax apparent.



CARDIOVASCULAR:

Normal.



OSSEOUS STRUCTURES:

No significant abnormalities.



VISUALIZED UPPER ABDOMEN:

Normal.



OTHER FINDINGS:

None.



IMPRESSION:

Left lower lobe infiltrate.



This represents positive finding and according to institutional 

protocol has been placed in the physician assistant folder for 

review.

## 2017-11-29 NOTE — ED PDOC
HPI: CCC, URI, Sore Throat


Time Seen by Provider: 11/29/17 07:10


Chief Complaint (Nursing): Cough, Cold, Congestion


Chief Complaint (Provider): Cough


History Per: Patient


History/Exam Limitations: no limitations


Have you had recent travel within the past 21 days to any of the following 

countries: [place], [place], [place] or [place]?: No


Onset/Duration Of Symptoms: Persistent


Current Symptoms Are (Timing): Still Present


Sick Contacts (Context): None


Associated Symptoms: Cough, Nasal Congestion, Vomiting (post-tussive).  denies: 

Fever, Chills, Sore Throat, Sputum, Nausea


Additional Complaint(s): 


48yo male, presents to ED for evaluation of cough, persistent over the past 

couple weeks. Patient states when his symptoms started, he had cough, congestion

, fever, chills and he went to an urgent care center; patient states he was 

prescribed Azithromycin and Flonase, which he has been taken as prescribed. He 

reports his fever, chills and congestion have resolved but his cough has been 

persistent. He states he has used OTC medications like Robitussin with no 

relief of cough symptoms. He also reports occasional episodes of post-tussive 

vomiting as well as shortness of breath. He has no other medical complaints.


PCP: None





Past Medical History


Reviewed: Historical Data, Nursing Documentation, Vital Signs


Vital Signs: 


 Last Vital Signs











Temp  98.1 F   11/29/17 06:35


 


Pulse  80   11/29/17 06:35


 


Resp  16   11/29/17 06:35


 


BP  127/79   11/29/17 06:35


 


Pulse Ox  95   11/29/17 11:10














- Medical History


PMH: Anxiety (Panic attacks), Asthma (as a child)


   Denies: Diabetes, Hepatitis, HIV, HTN, Seizures, Sexually Transmitted Disease





- Surgical History


Surgical History: No Surg Hx





- Family History


Family History: States: Unknown Family Hx, CAD





- Social History


Current smoker - smoking cessation education provided: No


Ex-Smoker (has not smoked in the last 12 months): No


Alcohol: None


Drugs: Denies





- Home Medications


Home Medications: 


 Ambulatory Orders











 Medication  Instructions  Recorded


 


Aspirin [Aspirin Chewable] 81 mg PO DAILY #30 chew 03/23/17


 


Atorvastatin [Lipitor] 10 mg PO DIN #30 tab 03/23/17


 


Metoprolol Succinate 25 mg PO DAILY #2 tab.er.24h 03/23/17


 


Naproxen [Naprosyn] 500 mg PO BID PRN #14 tablet 09/30/17


 


Albuterol HFA [Ventolin HFA 90 2 puff IH [id number] #1 inh 11/29/17





mcg/actuation (8 g)]  


 


Moxifloxacin [Avelox] 400 mg PO DAILY #7 tab 11/29/17














- Allergies


Allergies/Adverse Reactions: 


 Allergies











Allergy/AdvReac Type Severity Reaction Status Date / Time


 


No Known Allergies Allergy   Verified 03/21/17 11:00














Curb-65 Severity Score





- CURB-65 Severity Score


Confusion: No


Bun >19mg/dl (>7mmol/L): No


Respiratory Rate greater than/equal to 30: No


Systolic BP <90 or Diastolic BP less than/equal 60mmHg: No


Age >64: No


Curb-65 Score: 0


Percentage 30-day mortality: 0.6%





Review of Systems


ROS Statement: Except As Marked, All Systems Reviewed And Found Negative


Constitutional: Negative for: Fever, Chills


Cardiovascular: Negative for: Chest Pain


Respiratory: Positive for: Cough, Shortness of Breath (occasional episodes)


Gastrointestinal: Positive for: Vomiting (occasional post-tussive episodes)





Physical Exam





- Reviewed


Nursing Documentation Reviewed: Yes


Vital Signs Reviewed: Yes





- Physical Exam


Appears: Positive for: Non-toxic, No Acute Distress


Head Exam: Positive for: ATRAUMATIC, NORMAL INSPECTION, NORMOCEPHALIC


Skin: Positive for: Normal Color


Eye Exam: Positive for: Normal appearance


ENT: Positive for: Normal ENT Inspection.  Negative for: Pharyngeal Erythema, 

Tonsillar Exudate, Tonsillar Swelling


Neck: Positive for: Normal, Supple


Cardiovascular/Chest: Positive for: Regular Rate, Rhythm.  Negative for: Murmur


Respiratory: Positive for: Normal Breath Sounds.  Negative for: Wheezing, 

Respiratory Distress


Gastrointestinal/Abdominal: Positive for: Normal Exam, Soft.  Negative for: 

Tenderness


Neurologic/Psych: Positive for: Alert, Oriented.  Negative for: Motor/Sensory 

Deficits





- ECG


O2 Sat by Pulse Oximetry: 95 (RA)





Medical Decision Making


Medical Decision Making: 


Time: 0719


Impression: Acute bronchitis


Plan:


-- Chest x-ray


Reassess


Time: 0853


Chest x-ray as read by provider indicates no acute findings.





Time: 0900


Chest x-ray FINDINGS:





LUNGS:


Left lower lobe infiltrate suspicious for pneumonia. This appears in the 

basilar segment.





PLEURA:


No significant pleural effusion identified. No pneumothorax apparent.





CARDIOVASCULAR:


Normal.





OSSEOUS STRUCTURES:


No significant abnormalities.





VISUALIZED UPPER ABDOMEN:


Normal.





OTHER FINDINGS:


None.





IMPRESSION:


Left lower lobe infiltrate.





Time: 0941


Patient stable for discharge home, will give prescription for Avelox. Patient 

informed to follow up with PCP in 1-2 days.


--------------------------------------------------------------------------------

----------------------------------------------------------------





Scribe Attestation:


Documented by Ana Bonilla acting as a scribe for Adán Baer MD. 





Provider Attestation:


All medical record entries made by the Scribe were at my direction and 

personally dictated by me. I have reviewed the chart and agree that the record 

accurately reflects my personal performance of the history, physical exam, 

medical decision making, and the department course for this patient. I have 

also personally directed, reviewed, and agree with the discharge instructions 

and disposition.








Disposition





- Clinical Impression


Clinical Impression: 


 Pneumonia








- Patient ED Disposition


Is Patient to be Admitted: No


Doctor Will See Patient In The: Office


Counseled Patient/Family Regarding: Studies Performed, Diagnosis, Need For 

Followup





- Disposition


Referrals: 


[facility] [Outside]


Disposition: Routine/Home


Disposition Time: 11:09


Condition: GOOD


Additional Instructions: 


Take your medications as instructed. Follow up with your PCP in 2-3 days. 


Prescriptions: 


Albuterol HFA [Ventolin HFA 90 mcg/actuation (8 g)] 2 puff IH [id number] #1 inh


Moxifloxacin [Avelox] 400 mg PO DAILY #7 tab


Instructions:  Community Acquired Pneumonia (ED)

## 2018-01-19 ENCOUNTER — HOSPITAL ENCOUNTER (EMERGENCY)
Dept: HOSPITAL 14 - H.ER | Age: 48
Discharge: HOME | End: 2018-01-19
Payer: COMMERCIAL

## 2018-01-19 VITALS
OXYGEN SATURATION: 97 % | RESPIRATION RATE: 18 BRPM | SYSTOLIC BLOOD PRESSURE: 110 MMHG | TEMPERATURE: 98.7 F | DIASTOLIC BLOOD PRESSURE: 70 MMHG | HEART RATE: 87 BPM

## 2018-01-19 VITALS — BODY MASS INDEX: 29.5 KG/M2

## 2018-01-19 DIAGNOSIS — J45.909: ICD-10-CM

## 2018-01-19 DIAGNOSIS — R11.2: Primary | ICD-10-CM

## 2018-01-19 DIAGNOSIS — F41.9: ICD-10-CM

## 2018-01-19 DIAGNOSIS — Z79.82: ICD-10-CM

## 2018-01-19 NOTE — ED PDOC
HPI: General Adult


Time Seen by Provider: 01/19/18 05:27


Chief Complaint (Nursing): Abdominal Pain


Chief Complaint (Provider): Vomiting


History Per: Patient


History/Exam Limitations: no limitations


Onset/Duration Of Symptoms: Days (x1)


Current Symptoms Are (Timing): Gone Now


Additional Complaint(s): 





47 year old male presents to the emergency department complaining of vomiting 

yesterday, which is now resolved. Denies any abdominal pain, nausea, or 

diarrhea. All symptoms are resolved at this time. Patient ate bread and chicken 

soup yesterday, which was tolerated. No hematemesis. 





PMD: Dr. Sylvester Madera   





Past Medical History


Reviewed: Historical Data, Nursing Documentation, Vital Signs


Vital Signs: 


 Last Vital Signs











Temp  98.7 F   01/19/18 05:16


 


Pulse  87   01/19/18 05:16


 


Resp  18   01/19/18 05:16


 


BP  110/70   01/19/18 05:16


 


Pulse Ox  97   01/19/18 06:34














- Medical History


PMH: Anxiety (Panic attacks), Asthma (seosonal)


   Denies: Diabetes, Hepatitis, HIV, HTN, Chronic Kidney Disease, Seizures, 

Sexually Transmitted Disease





- Surgical History


Surgical History: No Surg Hx





- Family History


Family History: States: CAD





- Social History


Current smoker - smoking cessation education provided: No


Alcohol: None


Drugs: Denies





- Home Medications


Home Medications: 


 Ambulatory Orders











 Medication  Instructions  Recorded


 


Aspirin [Aspirin Chewable] 81 mg PO DAILY #30 chew 03/23/17


 


Atorvastatin [Lipitor] 10 mg PO DIN #30 tab 03/23/17


 


Metoprolol Succinate 25 mg PO DAILY #2 tab.er.24h 03/23/17


 


Naproxen [Naprosyn] 500 mg PO BID PRN #14 tablet 09/30/17


 


Albuterol HFA [Ventolin HFA 90 2 puff IH [id number] #1 inh 11/29/17





mcg/actuation (8 g)]  


 


Moxifloxacin [Avelox] 400 mg PO DAILY #7 tab 11/29/17


 


Ondansetron ODT [Zofran ODT] 4 mg PO Q8 PRN #12 odt 01/19/18














- Allergies


Allergies/Adverse Reactions: 


 Allergies











Allergy/AdvReac Type Severity Reaction Status Date / Time


 


No Known Allergies Allergy   Verified 03/21/17 11:00














Review of Systems


ROS Statement: Except As Marked, All Systems Reviewed And Found Negative


Gastrointestinal: Positive for: Vomiting (yesterday, now resolved).  Negative 

for: Nausea, Abdominal Pain, Diarrhea, Hematemesis





Physical Exam





- Reviewed


Nursing Documentation Reviewed: Yes


Vital Signs Reviewed: Yes





- Physical Exam


Appears: Positive for: Non-toxic, No Acute Distress


Head Exam: Positive for: ATRAUMATIC, NORMAL INSPECTION, NORMOCEPHALIC


Skin: Positive for: Normal Color, Warm, Dry


Eye Exam: Positive for: EOMI, Normal appearance, PERRL


Neck: Positive for: Normal, Painless ROM


Cardiovascular/Chest: Positive for: Regular Rate, Rhythm.  Negative for: Murmur


Respiratory: Positive for: Normal Breath Sounds.  Negative for: Accessory 

Muscle Use, Respiratory Distress


Gastrointestinal/Abdominal: Positive for: Normal Exam, Soft.  Negative for: 

Tenderness, Guarding, Rebound


Extremity: Positive for: Normal ROM.  Negative for: Pedal Edema, Deformity


Neurologic/Psych: Positive for: Alert, Oriented (x3)





- ECG


O2 Sat by Pulse Oximetry: 97 (RA)


Pulse Ox Interpretation: Normal





Medical Decision Making


Medical Decision Making: 


Initial Impression: Vomiting, resolved





Time: 05:43


Initial Plan:


--Zofran 4 mg PO


--Reevaluation 





--------------------------------------------------------------------------------

-----------------


Scribe Attestation:


Documented by Angela Flynn, acting as a scribe for Adán Baer MD





Provider Scribe Attestation:


All medical record entries made by the Scribe were at my direction and 

personally dictated by me. I have reviewed the chart and agree that the record 

accurately reflects my personal performance of the history, physical exam, 

medical decision making, and the department course for this patient. I have 

also personally directed, reviewed, and agree with the discharge instructions 

and disposition.





Disposition





- Clinical Impression


Clinical Impression: 


 Vomiting








- Patient ED Disposition


Is Patient to be Admitted: No


Doctor Will See Patient In The: Office


Counseled Patient/Family Regarding: Studies Performed, Diagnosis, Need For 

Followup





- Disposition


Referrals: 


Perlman,Donald B, MD [Primary Care Provider] - 


Disposition: Routine/Home


Disposition Time: 06:50


Condition: GOOD


Additional Instructions: 


Return for worsening. Follow up with your PCP In 2-3 days. 


Prescriptions: 


Ondansetron ODT [Zofran ODT] 4 mg PO Q8 PRN #12 odt


 PRN Reason: Nausea/Vomiting


Instructions:  Acute Nausea and Vomiting (ED)

## 2018-12-18 ENCOUNTER — HOSPITAL ENCOUNTER (EMERGENCY)
Dept: HOSPITAL 14 - H.ER | Age: 48
Discharge: HOME | End: 2018-12-18
Payer: COMMERCIAL

## 2018-12-18 VITALS — HEART RATE: 88 BPM | DIASTOLIC BLOOD PRESSURE: 64 MMHG | OXYGEN SATURATION: 98 % | SYSTOLIC BLOOD PRESSURE: 105 MMHG

## 2018-12-18 VITALS — RESPIRATION RATE: 18 BRPM

## 2018-12-18 VITALS — BODY MASS INDEX: 29.5 KG/M2

## 2018-12-18 VITALS — TEMPERATURE: 98.5 F

## 2018-12-18 DIAGNOSIS — Z79.899: ICD-10-CM

## 2018-12-18 DIAGNOSIS — M71.22: Primary | ICD-10-CM

## 2018-12-18 DIAGNOSIS — D72.819: ICD-10-CM

## 2018-12-18 LAB
ALBUMIN SERPL-MCNC: 3.4 G/DL (ref 3.5–5)
ALBUMIN/GLOB SERPL: 1 {RATIO} (ref 1–2.1)
ALT SERPL-CCNC: 51 U/L (ref 21–72)
AST SERPL-CCNC: 53 U/L (ref 17–59)
BACTERIA #/AREA URNS HPF: (no result) /[HPF]
BASOPHILS # BLD AUTO: 0 K/UL (ref 0–0.2)
BASOPHILS NFR BLD: 0.8 % (ref 0–2)
BILIRUB UR-MCNC: NEGATIVE MG/DL
BUN SERPL-MCNC: 25 MG/DL (ref 9–20)
CALCIUM SERPL-MCNC: 8.5 MG/DL (ref 8.4–10.2)
CAOX CRY #/AREA URNS HPF: (no result) /HPF
COLOR UR: (no result)
EOSINOPHIL # BLD AUTO: 0 K/UL (ref 0–0.7)
EOSINOPHIL NFR BLD: 0.5 % (ref 0–4)
ERYTHROCYTE [DISTWIDTH] IN BLOOD BY AUTOMATED COUNT: 12.9 % (ref 11.5–14.5)
ERYTHROCYTE [SEDIMENTATION RATE] IN BLOOD: 29 MM/HR (ref 0–15)
GFR NON-AFRICAN AMERICAN: > 60
GLUCOSE UR STRIP-MCNC: (no result) MG/DL
HGB BLD-MCNC: 13.3 G/DL (ref 12–18)
LEUKOCYTE ESTERASE UR-ACNC: (no result) LEU/UL
LYMPHOCYTES # BLD AUTO: 0.3 K/UL (ref 1–4.3)
LYMPHOCYTES NFR BLD AUTO: 17.3 % (ref 20–40)
MCH RBC QN AUTO: 31.8 PG (ref 27–31)
MCHC RBC AUTO-ENTMCNC: 34.6 G/DL (ref 33–37)
MCV RBC AUTO: 92 FL (ref 80–94)
MONOCYTES # BLD: 0.3 K/UL (ref 0–0.8)
MONOCYTES NFR BLD: 13.7 % (ref 0–10)
NEUTROPHILS # BLD: 1.3 K/UL (ref 1.8–7)
NEUTROPHILS NFR BLD AUTO: 67.7 % (ref 50–75)
NRBC BLD AUTO-RTO: 0.3 % (ref 0–0)
PH UR STRIP: 5 [PH] (ref 5–8)
PLATELET # BLD: 61 K/UL (ref 130–400)
PMV BLD AUTO: 9.1 FL (ref 7.2–11.7)
PROT UR STRIP-MCNC: 100 MG/DL
RBC # BLD AUTO: 4.17 MIL/UL (ref 4.4–5.9)
RBC # UR STRIP: (no result) /UL
SP GR UR STRIP: 1.03 (ref 1–1.03)
SQUAMOUS EPITHIAL: 1 /HPF (ref 0–5)
URATE SERPL-MCNC: 4.5 MG/DL (ref 3.5–8.5)
URINE CLARITY: (no result)
UROBILINOGEN UR-MCNC: (no result) MG/DL (ref 0.2–1)
WBC # BLD AUTO: 1.9 K/UL (ref 4.8–10.8)

## 2018-12-18 PROCEDURE — 73562 X-RAY EXAM OF KNEE 3: CPT

## 2018-12-18 PROCEDURE — 80053 COMPREHEN METABOLIC PANEL: CPT

## 2018-12-18 PROCEDURE — 85025 COMPLETE CBC W/AUTO DIFF WBC: CPT

## 2018-12-18 PROCEDURE — 82550 ASSAY OF CK (CPK): CPT

## 2018-12-18 PROCEDURE — 84550 ASSAY OF BLOOD/URIC ACID: CPT

## 2018-12-18 PROCEDURE — 93971 EXTREMITY STUDY: CPT

## 2018-12-18 PROCEDURE — 96374 THER/PROPH/DIAG INJ IV PUSH: CPT

## 2018-12-18 PROCEDURE — 85651 RBC SED RATE NONAUTOMATED: CPT

## 2018-12-18 PROCEDURE — 99284 EMERGENCY DEPT VISIT MOD MDM: CPT

## 2018-12-18 PROCEDURE — 87040 BLOOD CULTURE FOR BACTERIA: CPT

## 2018-12-18 PROCEDURE — 81003 URINALYSIS AUTO W/O SCOPE: CPT

## 2018-12-18 PROCEDURE — 87390 HIV-1 AG IA: CPT

## 2018-12-18 NOTE — RAD
Date of service: 



12/18/2018



PROCEDURE:  Left Knee Radiographs.



HISTORY:

Pain.



COMPARISON:

None.



FINDINGS:



BONES:

Normal. No fracture. 



JOINTS:

Normal. No osteoarthritis. 



JOINT EFFUSION:

None. 



OTHER FINDINGS:

None.



IMPRESSION:

Normal radiographs of the left knee.

## 2018-12-18 NOTE — ED PDOC
Lower Extremity Pain/Injury


Time Seen by Provider: 12/18/18 08:36


Chief Complaint (Nursing): Lower Extremity Problem/Injury


Additional Complaint(s): 





CC: Left knee pain and swelling x 1 day





HPI: 48 year old male with PMHx childhood asthma and left knee meniscus repair x

4 years ago presents with left knee pain and swelling x 1 day associated with 

chills and sweats x 2 days. Patient reports he has B/L knee pain for many 

months.  Patient works at [employer] and states his pain is worse with long 

standing and walking. Patient reports pain 8/10, aching, radiates to left toe. 

Patient took 2 Aleve at 3 am this morning. Denies any weakness, numbness or 

tingling. Denies any injury to left knee. Denies any chest pain, dyspnea, cough,

headache or dizziness. 





PMD: None


 





Past Medical History


Vital Signs: 





                                Last Vital Signs











Temp  98.5 F   12/18/18 08:45


 


Pulse  111 H  12/18/18 08:57


 


Resp  20   12/18/18 08:57


 


BP  113/82   12/18/18 08:57


 


Pulse Ox  97   12/18/18 08:57














- Medical History


PMH: Anxiety (Panic attacks), Asthma (as a child)


   Denies: Diabetes, Hepatitis, HIV, HTN, Chronic Kidney Disease, Seizures, 

Sexually Transmitted Disease





- Family History


Family History: States: Unknown Family Hx, CAD





- Home Medications


Home Medications: 


                                Ambulatory Orders











 Medication  Instructions  Recorded


 


Atorvastatin [Lipitor] 10 mg PO DIN #30 tab 03/23/17


 


RX: Aspirin [Aspirin Chewable] 81 mg PO DAILY #30 chew 03/23/17


 


RX: Metoprolol Succinate 25 mg PO DAILY #2 tab.er.24h 03/23/17


 


Naproxen [Naprosyn] 500 mg PO BID PRN #14 tablet 09/30/17


 


Moxifloxacin [Avelox] 400 mg PO DAILY #7 tab 11/29/17


 


RX: Albuterol HFA [Ventolin HFA 90 2 puff IH [id number] #1 inh 11/29/17





mcg/actuation (8 g)]  


 


Ondansetron ODT [Zofran ODT] 4 mg PO Q8 PRN #12 odt 01/19/18


 


Naproxen [Naprosyn] 500 mg PO BID PRN #15 tablet 12/18/18














- Allergies


Allergies/Adverse Reactions: 


                                    Allergies











Allergy/AdvReac Type Severity Reaction Status Date / Time


 


No Known Allergies Allergy   Verified 12/18/18 08:52














Review of Systems


ROS Statement: Except As Marked, All Systems Reviewed And Found Negative





Physical Exam





- Physical Exam


Appears: Positive for: Non-toxic, No Acute Distress


Head Exam: Positive for: ATRAUMATIC, NORMOCEPHALIC


Skin: Positive for: Normal Color


Neck: Positive for: Normal


Cardiovascular/Chest: Positive for: Regular Rate, Rhythm


Respiratory: Positive for: Normal Breath Sounds.  Negative for: Crackles, Rales,

 Wheezing, Respiratory Distress


Gastrointestinal/Abdominal: Positive for: Soft.  Negative for: Tenderness, 

Guarding, Rebound


Extremity: Positive for: Other (Mild swelling at the lateral aspects of left 

knee at the suprapatellar area. Mild tenderness at the anterior left knee. No 

knee erythema or warmth. Neg anterior and posterior drawer test. Full ROM and 

NVI.).  Negative for: Pedal Edema, Calf Tenderness





- Laboratory Results


Result Diagrams: 


                                 12/18/18 09:18





                                 12/18/18 09:18





- ECG


O2 Sat by Pulse Oximetry: 97





- Radiology


X-Ray: Read By Radiologist


X-Ray Interpretation: Other (IMPRESSION: Normal radiograph of left knee.     

Left lower extremity doppler US: IMPRESSION: No sonographic or Doppler evidence 

for DVT in left lower extremity.  Note is made of a 5.8 cm popliteal cyst.)





- Progress


ED Course And Treament: 





48 year old Male has left knee pain and swelling x 1 day associated with chills 

and sweats x 2 days. DDX osteoarthritis of knee, septic arthritis or knee b

ursitis.





PLAN:





CBC W/ DIFF


CMP


Left knee x-ray


Doppler US left knee


Morphine 2 mg IPV


Re-evaluate











Patient's x-ray of left knee is negative for any fx or effusion. Doppler US 

shows popliteal cyst 5.8 cm. WBC is 1.9 and platelet is 61. Patient is aware his

 wbc runs low. rapid HIV is neg. 





Patient is advised to drink plenty of fluids as his CPK is 343 with normal renal

 function. 





Patient is stable to discharge home. Advised to f/u with PMD outpatient as soon 

as possible for neutropenia and thrombocytopenia work up. ([facility] information 

provided).











Plan d/w Dr. Carlin

















Disposition





- Clinical Impression


Clinical Impression: 


 Knee pain, Popliteal cyst, Chronic leukopenia








- Disposition


Referrals: 


[facility] [Outside]


Disposition Time: 12:56


Condition: GOOD


Prescriptions: 


Naproxen [Naprosyn] 500 mg PO BID PRN #15 tablet


 PRN Reason: Pain, Moderate (4-7)


Instructions:  Baker's Cyst, Knee Pain


Forms:  [facility] (English)

## 2018-12-18 NOTE — US
Date of service: 



12/18/2018



HISTORY:

 Left posterior knee pain.  



PRIORS:

None. 



FINDINGS:

2-D, color and duplex Doppler analysis of the lower extremity venous 

circulation using routine protocol from the femoral veins through the 

popliteal veins.



Venous compressibility: Normal. 



Flow and augmentation patterns: Normal. 



Visualized veins upper third of calf: Normal. 



Baker cyst: There is a popliteal cyst measuring approximately 5.8 x 

2.9 x 2.5 cm.  Few internal septations are incidentally identified. 



IMPRESSION:

No sonographic or Doppler evidence for DVT in left lower extremity.  

Note is made of a 5.8 cm popliteal cyst.